# Patient Record
Sex: FEMALE | Race: WHITE | NOT HISPANIC OR LATINO | Employment: OTHER | URBAN - METROPOLITAN AREA
[De-identification: names, ages, dates, MRNs, and addresses within clinical notes are randomized per-mention and may not be internally consistent; named-entity substitution may affect disease eponyms.]

---

## 2017-06-27 RX ORDER — SIMVASTATIN 20 MG
TABLET ORAL
Qty: 90 TAB | Refills: 0 | Status: SHIPPED | OUTPATIENT
Start: 2017-06-27 | End: 2017-09-23 | Stop reason: SDUPTHER

## 2017-06-27 RX ORDER — OMEPRAZOLE 20 MG/1
CAPSULE, DELAYED RELEASE ORAL
Qty: 90 CAP | Refills: 0 | Status: SHIPPED | OUTPATIENT
Start: 2017-06-27 | End: 2017-09-23 | Stop reason: SDUPTHER

## 2017-06-27 RX ORDER — VALSARTAN AND HYDROCHLOROTHIAZIDE 80; 12.5 MG/1; MG/1
TABLET, FILM COATED ORAL
Qty: 90 TAB | Refills: 0 | Status: SHIPPED | OUTPATIENT
Start: 2017-06-27 | End: 2017-09-23 | Stop reason: SDUPTHER

## 2017-07-06 ENCOUNTER — HOSPITAL ENCOUNTER (OUTPATIENT)
Dept: RADIOLOGY | Facility: MEDICAL CENTER | Age: 77
End: 2017-07-06
Attending: FAMILY MEDICINE
Payer: MEDICARE

## 2017-07-06 DIAGNOSIS — Z12.31 VISIT FOR SCREENING MAMMOGRAM: ICD-10-CM

## 2017-07-06 PROCEDURE — 77063 BREAST TOMOSYNTHESIS BI: CPT

## 2017-08-25 ENCOUNTER — TELEPHONE (OUTPATIENT)
Dept: MEDICAL GROUP | Facility: MEDICAL CENTER | Age: 77
End: 2017-08-25

## 2017-08-25 NOTE — TELEPHONE ENCOUNTER
ANNUAL WELLNESS VISIT PRE-VISIT PLANNING     1.  Reviewed note from last office visit with PCP: YES    2.  If any orders were placed at last visit, do we have Results/Consult Notes?        •  Labs - Labs were not ordered at last office visit.       •  Imaging - Imaging was not ordered at last office visit.       •  Referrals - No referrals were ordered at last office visit.    3.  Immunizations were updated in [facility] using WebIZ?: Yes       •  WebIZ Recommendations: Zoster (Shingles)  Influenza w/preserv.  Tdap       •  Is patient due for Tdap? NO       •  Is patient due for Shingles? NO     4.  Patient is due for the following Health Maintenance Topics:   Health Maintenance Due   Topic Date Due   • Annual Wellness Visit  1940   • IMM DTaP/Tdap/Td Vaccine (1 - Tdap) 12/26/1959   • PAP SMEAR  12/26/1961   • COLONOSCOPY  12/26/1990   • IMM ZOSTER VACCINE  12/26/2000   • BONE DENSITY  12/26/2005   • IMM INFLUENZA (1) 09/01/2017       - Patient is up-to-date on all Health Maintenance topics. No records have been requested at this time.    5.  Reviewed/Updated the following with patient:       •   Preferred Pharmacy? NO       •   Preferred Lab? NO       •   Medications? NO       •   Social History? NO       •   Family History? YES. Was Abstract Encounter opened and chart updated? NO    6.  Care Team Updated:       •   DME Company (gait device, O2, CPAP, etc.): NO       •   Other Specialists (eye doctor, derm, GYN, cardiology, endo, etc): NO    7.  Patient has the following Care Path diagnoses on Problem List:  NONE    8.  Specialty Comments was updated with diagnosis information provided by [facility]: NO    9.  Patient was not advised: “This is a free wellness visit. The provider will screen for medical conditions to help you stay healthy. If you have other concerns to address you may be asked to discuss these at a separate visit or there may be an additional fee.”     6.  Patient was NOT informed to arrive 15 min prior to  their scheduled appointment and bring in their medication bottles.

## 2017-08-31 ENCOUNTER — OFFICE VISIT (OUTPATIENT)
Dept: MEDICAL GROUP | Facility: MEDICAL CENTER | Age: 77
End: 2017-08-31
Payer: MEDICARE

## 2017-08-31 VITALS
SYSTOLIC BLOOD PRESSURE: 146 MMHG | HEIGHT: 58 IN | WEIGHT: 126.2 LBS | BODY MASS INDEX: 26.49 KG/M2 | TEMPERATURE: 97.3 F | DIASTOLIC BLOOD PRESSURE: 80 MMHG | RESPIRATION RATE: 96 BRPM | HEART RATE: 74 BPM

## 2017-08-31 DIAGNOSIS — I10 ESSENTIAL HYPERTENSION: ICD-10-CM

## 2017-08-31 DIAGNOSIS — Z00.00 MEDICARE ANNUAL WELLNESS VISIT, SUBSEQUENT: ICD-10-CM

## 2017-08-31 DIAGNOSIS — M19.90 OSTEOARTHRITIS, UNSPECIFIED OSTEOARTHRITIS TYPE, UNSPECIFIED SITE: ICD-10-CM

## 2017-08-31 DIAGNOSIS — M54.32 SCIATICA OF LEFT SIDE: ICD-10-CM

## 2017-08-31 DIAGNOSIS — G14 POST-POLIO SYNDROME: ICD-10-CM

## 2017-08-31 DIAGNOSIS — Z13.0 SCREENING FOR DEFICIENCY ANEMIA: ICD-10-CM

## 2017-08-31 DIAGNOSIS — K21.9 GASTROESOPHAGEAL REFLUX DISEASE, ESOPHAGITIS PRESENCE NOT SPECIFIED: ICD-10-CM

## 2017-08-31 DIAGNOSIS — R73.9 HYPERGLYCEMIA: ICD-10-CM

## 2017-08-31 DIAGNOSIS — Z13.29 SCREENING FOR THYROID DISORDER: ICD-10-CM

## 2017-08-31 DIAGNOSIS — H16.403 CORNEAL NEOVASCULARIZATION OF BOTH EYES: ICD-10-CM

## 2017-08-31 DIAGNOSIS — E78.2 MIXED HYPERLIPIDEMIA: ICD-10-CM

## 2017-08-31 DIAGNOSIS — H81.13 BENIGN PAROXYSMAL POSITIONAL VERTIGO DUE TO BILATERAL VESTIBULAR DISORDER: ICD-10-CM

## 2017-08-31 DIAGNOSIS — H93.13 TINNITUS, BILATERAL: ICD-10-CM

## 2017-08-31 DIAGNOSIS — Z12.11 SCREENING FOR COLON CANCER: ICD-10-CM

## 2017-08-31 DIAGNOSIS — M51.36 DDD (DEGENERATIVE DISC DISEASE), LUMBAR: ICD-10-CM

## 2017-08-31 PROCEDURE — G0439 PPPS, SUBSEQ VISIT: HCPCS | Performed by: FAMILY MEDICINE

## 2017-08-31 RX ORDER — CHLORAL HYDRATE 500 MG
1000 CAPSULE ORAL
COMMUNITY

## 2017-08-31 ASSESSMENT — PATIENT HEALTH QUESTIONNAIRE - PHQ9: CLINICAL INTERPRETATION OF PHQ2 SCORE: 0

## 2017-08-31 NOTE — PROGRESS NOTES
Chief Complaint   Patient presents with   • Annual Wellness Visit         HPI:  Miriam is a 76 y.o. 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     • Omega-3 Fatty Acids (FISH OIL) 1000 MG Cap capsule Take 1,000 mg by mouth 3 times a day, with meals.     • omeprazole (PRILOSEC) 20 MG delayed-release capsule Take 1 capsule by mouth  every day 90 Cap 0   • valsartan-hydrochlorothiazide (DIOVAN-HCT) 80-12.5 MG per tablet Take 1 tablet by mouth  every day 90 Tab 0   • simvastatin (ZOCOR) 20 MG Tab Take 1 tablet by mouth  every evening 90 Tab 0   • ibuprofen (MOTRIN) 600 MG Tab Take 600 mg by mouth.     • DHA-EPA-VITAMIN E PO Take  by mouth.       No current facility-administered medications for this visit.         Patient is taking medications as noted in medication list.  Current supplements as per medication list.     Allergies: Penicillins    Current social contact/activities: Pt keeps herself busy with daily activities.      Is patient current with immunizations? No, due for PNEUMOVAX (PPSV23). Patient is interested in receiving TDAP today.    She  reports that she quit smoking about 42 years ago. Her smoking use included Cigarettes. She has a 2.50 pack-year smoking history. She has never used smokeless  tobacco. She reports that she drinks about 2.4 oz of alcohol per week . She reports that she does not use drugs.  Counseling given: Not Answered        DPA/Advanced directive: Patient has Advanced Directive on file.     ROS:    Gait: Uses no assistive device   Ostomy: no   Other tubes: no   Amputations: no   Chronic oxygen use no   Last eye exam July 13th    Wears hearing aids: no   : Denies incontinence.           Depression Screening    Little interest or pleasure in doing things?  0 - not at all  Feeling down, depressed, or hopeless? 0 - not at all  Patient Health Questionnaire Score: 0    If depressive symptoms identified deferred to follow up visit unless specifically addressed in assessment and plan.    Interpretation of PHQ-9 Total Score   Score Severity   1-4 No Depression   5-9 Mild Depression   10-14 Moderate Depression   15-19 Moderately Severe Depression   20-27 Severe Depression    Screening for Cognitive Impairment    Three Minute Recall (apple, watch, aidee)  3/3    Draw clock face with all 12 numbers set to the hand to show 10 minutes past 11 o'clock  1 5/5  If cognitive concerns identified, deferred for follow up unless specifically addressed in assessment and plan.    Fall Risk Assessment    Has the patient had two or more falls in the last year or any fall with injury in the last year?  No  If fall risk identified, deferred for follow up unless specifically addressed in assessment and plan.    Safety Assessment    Throw rugs on floor.  Yes  Handrails on all stairs.  Yes  Good lighting in all hallways.  Yes  Difficulty hearing.  No  Patient counseled about all safety risks that were identified.    Functional Assessment ADLs    Are there any barriers preventing you from cooking for yourself or meeting nutritional needs?  No.    Are there any barriers preventing you from driving safely or obtaining transportation?  No.    Are there any barriers preventing you from using a telephone or calling for  help?  No.    Are there any barriers preventing you from shopping?  No.    Are there any barriers preventing you from taking care of your own finances?  No.    Are there any barriers preventing you from managing your medications?  No.    Are you currently engaging any exercise or physical activity?  Yes.  Pt walks and plays pickle ball.     Health Maintenance Summary                Annual Wellness Visit Overdue 1940     IMM DTaP/Tdap/Td Vaccine Overdue 12/26/1959     PAP SMEAR Overdue 12/26/1961     COLONOSCOPY Overdue 12/26/1990     IMM ZOSTER VACCINE Overdue 12/26/2000     BONE DENSITY Overdue 12/26/2005     IMM INFLUENZA Next Due 9/1/2017      Done 10/10/2016 Imm Admin: Influenza Vaccine Adult HD    IMM PNEUMOCOCCAL 65+ (ADULT) LOW/MEDIUM RISK SERIES Next Due 10/10/2017      Done 10/10/2016 Imm Admin: Pneumococcal Conjugate Vaccine (Prevnar/PCV-13)    MAMMOGRAM Next Due 7/6/2018      Done 7/6/2017 MA-MAMMO SCREENING BILAT W/TOMOSYNTHESIS W/CAD     Patient has more history with this topic...          Patient Care Team:  Delfina Dietz M.D. as PCP - General  Chriss Medina M.D. as Consulting Physician (Ophthalmology)    Social History   Substance Use Topics   • Smoking status: Former Smoker     Packs/day: 0.25     Years: 10.00     Types: Cigarettes     Quit date: 8/23/1975   • Smokeless tobacco: Never Used   • Alcohol use 2.4 oz/week     4 Glasses of wine per week      Comment: weekly      Family History   Problem Relation Age of Onset   • Hypertension Mother    • Lung Disease Mother      pulmonary fibrosis   • Other Father      trauma     She  has a past medical history of Corneal neovascularization of both eyes; GERD (gastroesophageal reflux disease); Hyperlipidemia; Hypertension; and Postpolio syndrome. She also has no past medical history of Breast cancer (CMS-HCC).   Past Surgical History:   Procedure Laterality Date   • ABDOMINAL HYSTERECTOMY TOTAL     • ACHILLES TENDON REPAIR Left    • CHOLECYSTECTOMY  "    • HERNIA REPAIR     • PRIMARY C SECTION     • SHOULDER ARTHROSCOPY     • TENDON RELEASE FOOT      ×3. Secondary to polio.   • TONSILLECTOMY AND ADENOIDECTOMY             Exam:     Blood pressure 146/80, pulse 74, temperature 36.3 °C (97.3 °F), resp. rate (!) 96, height 1.467 m (4' 9.75\"), weight 57.2 kg (126 lb 3.2 oz). Body mass index is 26.6 kg/m².    Hearing good.    Dentition good  Alert, oriented in no acute distress.  Eye contact is good, speech goal directed, affect calm  Neck: Supple, no adenopathy  Lungs: Clear to auscultation bilaterally without wheezes or rhonchi  CV: Regular rate and rhythm without murmur    Assessment and Plan. The following treatment and monitoring plan is recommended:    1. Medicare annual wellness visit, subsequent  Routine anticipatory guidance. No special services needed at this time    2. DDD (degenerative disc disease), lumbar  Stable. Continue stretching exercises.    3. Sciatica of left side  Stable. Continue current therapy    4. Post-polio syndrome  Stable. Continue current therapy    5. Osteoarthritis, unspecified osteoarthritis type, unspecified site  Stable. Continue current therapy    6. Tinnitus, bilateral  Refer to audiology  - REFERRAL TO AUDIOLOGY    7. Screening for colon cancer    - OCCULT BLOOD FECES IMMUNOASSAY (FIT); Future    8. Mixed hyperlipidemia  Check lipid panel and adjust dose as needed  - LIPID PROFILE; Future    9. Gastroesophageal reflux disease, esophagitis presence not specified  Stable. Continue current medications  - CBC WITH DIFFERENTIAL; Future    10. Essential hypertension  Stable. Continue current medications  - COMP METABOLIC PANEL; Future    11. Benign paroxysmal positional vertigo due to bilateral vestibular disorder  Refer to audiology    12. Corneal neovascularization of both eyes  Continue ophthalmology follow-up    13. Hyperglycemia  Stable. Continue diet and exercise. Recheck labs  - COMP METABOLIC PANEL; Future  - HEMOGLOBIN A1C; " Future    14. Screening for deficiency anemia  Screening labs ordered.  Await results for counseling.      15. Screening for thyroid disorder  Screening labs ordered.  Await results for counseling.    - TSH; Future      Services suggested: No services needed at this time  Health Care Screening recommendations as per orders if indicated.  Referrals offered: PT/OT/Nutrition counseling/Behavioral Health/Smoking cessation as per orders if indicated.    Discussion today about general wellness and lifestyle habits:    · Prevent falls and reduce trip hazards; Cautioned about securing or removing rugs.  · Have a working fire alarm and carbon monoxide detector;   · Engage in regular physical activity and social activities       Follow-up: Return in about 1 year (around 8/31/2018).

## 2017-09-07 ENCOUNTER — HOSPITAL ENCOUNTER (OUTPATIENT)
Facility: MEDICAL CENTER | Age: 77
End: 2017-09-07
Attending: FAMILY MEDICINE
Payer: MEDICARE

## 2017-09-07 PROCEDURE — 82274 ASSAY TEST FOR BLOOD FECAL: CPT

## 2017-09-12 ENCOUNTER — TELEPHONE (OUTPATIENT)
Dept: MEDICAL GROUP | Facility: MEDICAL CENTER | Age: 77
End: 2017-09-12

## 2017-09-12 DIAGNOSIS — Z12.11 SCREENING FOR COLON CANCER: ICD-10-CM

## 2017-09-12 NOTE — TELEPHONE ENCOUNTER
1. Caller Name: Pt                      Call Back Number: [phone]    2. Message: Pt called to get her lab results she had done on 9/8/17 at [facility]. Results are in media.     3. Patient approves office to leave a detailed voicemail/MyChart message: yes

## 2017-09-13 LAB — HEMOCCULT STL QL IA: NEGATIVE

## 2017-09-14 NOTE — TELEPHONE ENCOUNTER
Please inform pt:  Labs reviewed. Kidney function, liver function, cholesterol look good. Glucose slightly elevated at 104. Watch carbohydrates and sugars in diet. Continue current medications

## 2017-09-26 RX ORDER — VALSARTAN AND HYDROCHLOROTHIAZIDE 80; 12.5 MG/1; MG/1
TABLET, FILM COATED ORAL
Qty: 90 TAB | Refills: 3 | Status: SHIPPED | OUTPATIENT
Start: 2017-09-26 | End: 2018-08-31 | Stop reason: SDUPTHER

## 2017-09-26 RX ORDER — SIMVASTATIN 20 MG
20 TABLET ORAL
Qty: 90 TAB | Refills: 0 | OUTPATIENT
Start: 2017-09-26

## 2017-09-26 RX ORDER — VALSARTAN AND HYDROCHLOROTHIAZIDE 80; 12.5 MG/1; MG/1
1 TABLET, FILM COATED ORAL
Qty: 90 TAB | Refills: 0 | OUTPATIENT
Start: 2017-09-26

## 2017-09-26 RX ORDER — OMEPRAZOLE 20 MG/1
20 CAPSULE, DELAYED RELEASE ORAL
Qty: 90 CAP | Refills: 0 | OUTPATIENT
Start: 2017-09-26

## 2017-09-26 RX ORDER — OMEPRAZOLE 20 MG/1
CAPSULE, DELAYED RELEASE ORAL
Qty: 90 CAP | Refills: 3 | Status: SHIPPED | OUTPATIENT
Start: 2017-09-26 | End: 2018-08-31 | Stop reason: SDUPTHER

## 2017-09-26 RX ORDER — SIMVASTATIN 20 MG
TABLET ORAL
Qty: 90 TAB | Refills: 3 | Status: SHIPPED | OUTPATIENT
Start: 2017-09-26 | End: 2018-08-31 | Stop reason: SDUPTHER

## 2017-09-26 NOTE — TELEPHONE ENCOUNTER
Was the patient seen in the last year in this department? Yes     Does patient have an active prescription for medications requested? No     Received Request Via: Patient     Last seen: 08/31/2017 Dietz

## 2017-09-26 NOTE — TELEPHONE ENCOUNTER
From: Miriam Noonan  Sent: 9/26/2017 9:11 AM PDT  Subject: Medication Renewal Request    Miriam Noonan would like a refill of the following medications:  omeprazole (PRILOSEC) 20 MG delayed-release capsule [Delfina Dietz M.D.]  valsartan-hydrochlorothiazide (DIOVAN-HCT) 80-12.5 MG per tablet [Delfina Dietz M.D.]  simvastatin (ZOCOR) 20 MG Tab [Delfina Dietz M.D.]    Preferred pharmacy: [facility] - [address]    Comment:

## 2018-07-10 ENCOUNTER — HOSPITAL ENCOUNTER (OUTPATIENT)
Dept: RADIOLOGY | Facility: MEDICAL CENTER | Age: 78
End: 2018-07-10
Attending: FAMILY MEDICINE
Payer: MEDICARE

## 2018-07-10 DIAGNOSIS — Z12.31 VISIT FOR SCREENING MAMMOGRAM: ICD-10-CM

## 2018-07-10 PROCEDURE — 77067 SCR MAMMO BI INCL CAD: CPT

## 2018-08-31 RX ORDER — OMEPRAZOLE 20 MG/1
CAPSULE, DELAYED RELEASE ORAL
Qty: 90 CAP | Refills: 0 | Status: SHIPPED | OUTPATIENT
Start: 2018-08-31 | End: 2018-09-04 | Stop reason: SDUPTHER

## 2018-08-31 RX ORDER — SIMVASTATIN 20 MG
TABLET ORAL
Qty: 90 TAB | Refills: 0 | Status: SHIPPED | OUTPATIENT
Start: 2018-08-31 | End: 2018-09-04 | Stop reason: SDUPTHER

## 2018-08-31 RX ORDER — VALSARTAN AND HYDROCHLOROTHIAZIDE 80; 12.5 MG/1; MG/1
TABLET, FILM COATED ORAL
Qty: 90 TAB | Refills: 0 | Status: SHIPPED | OUTPATIENT
Start: 2018-08-31 | End: 2018-09-04 | Stop reason: SDUPTHER

## 2018-09-04 ENCOUNTER — OFFICE VISIT (OUTPATIENT)
Dept: MEDICAL GROUP | Facility: MEDICAL CENTER | Age: 78
End: 2018-09-04
Payer: MEDICARE

## 2018-09-04 VITALS
TEMPERATURE: 97.9 F | OXYGEN SATURATION: 95 % | DIASTOLIC BLOOD PRESSURE: 70 MMHG | SYSTOLIC BLOOD PRESSURE: 142 MMHG | BODY MASS INDEX: 26.87 KG/M2 | HEIGHT: 58 IN | HEART RATE: 76 BPM | WEIGHT: 128 LBS | RESPIRATION RATE: 20 BRPM

## 2018-09-04 DIAGNOSIS — Z12.11 SCREENING FOR COLON CANCER: ICD-10-CM

## 2018-09-04 DIAGNOSIS — M19.90 OSTEOARTHRITIS, UNSPECIFIED OSTEOARTHRITIS TYPE, UNSPECIFIED SITE: ICD-10-CM

## 2018-09-04 DIAGNOSIS — Z13.0 SCREENING FOR DEFICIENCY ANEMIA: ICD-10-CM

## 2018-09-04 DIAGNOSIS — G14 POST-POLIO SYNDROME: ICD-10-CM

## 2018-09-04 DIAGNOSIS — I10 ESSENTIAL HYPERTENSION: ICD-10-CM

## 2018-09-04 DIAGNOSIS — H81.13 BENIGN PAROXYSMAL POSITIONAL VERTIGO DUE TO BILATERAL VESTIBULAR DISORDER: ICD-10-CM

## 2018-09-04 DIAGNOSIS — M54.32 SCIATICA OF LEFT SIDE: ICD-10-CM

## 2018-09-04 DIAGNOSIS — H16.403 CORNEAL NEOVASCULARIZATION OF BOTH EYES: ICD-10-CM

## 2018-09-04 DIAGNOSIS — M79.641 RIGHT HAND PAIN: ICD-10-CM

## 2018-09-04 DIAGNOSIS — M51.36 DDD (DEGENERATIVE DISC DISEASE), LUMBAR: ICD-10-CM

## 2018-09-04 DIAGNOSIS — E78.2 MIXED HYPERLIPIDEMIA: ICD-10-CM

## 2018-09-04 DIAGNOSIS — H93.13 TINNITUS OF BOTH EARS: ICD-10-CM

## 2018-09-04 DIAGNOSIS — Z00.00 MEDICARE ANNUAL WELLNESS VISIT, SUBSEQUENT: ICD-10-CM

## 2018-09-04 DIAGNOSIS — R73.9 HYPERGLYCEMIA: ICD-10-CM

## 2018-09-04 DIAGNOSIS — K21.9 GASTROESOPHAGEAL REFLUX DISEASE, ESOPHAGITIS PRESENCE NOT SPECIFIED: ICD-10-CM

## 2018-09-04 DIAGNOSIS — Z13.29 SCREENING FOR THYROID DISORDER: ICD-10-CM

## 2018-09-04 PROCEDURE — G0439 PPPS, SUBSEQ VISIT: HCPCS | Performed by: FAMILY MEDICINE

## 2018-09-04 RX ORDER — SIMVASTATIN 20 MG
TABLET ORAL
Qty: 90 TAB | Refills: 3 | Status: SHIPPED | OUTPATIENT
Start: 2018-09-04

## 2018-09-04 RX ORDER — VALSARTAN AND HYDROCHLOROTHIAZIDE 80; 12.5 MG/1; MG/1
1 TABLET, FILM COATED ORAL
Qty: 90 TAB | Refills: 3 | Status: SHIPPED | OUTPATIENT
Start: 2018-09-04

## 2018-09-04 RX ORDER — OMEPRAZOLE 20 MG/1
20 CAPSULE, DELAYED RELEASE ORAL
Qty: 90 CAP | Refills: 3 | Status: SHIPPED | OUTPATIENT
Start: 2018-09-04

## 2018-09-04 ASSESSMENT — ACTIVITIES OF DAILY LIVING (ADL): BATHING_REQUIRES_ASSISTANCE: 0

## 2018-09-04 ASSESSMENT — PATIENT HEALTH QUESTIONNAIRE - PHQ9: CLINICAL INTERPRETATION OF PHQ2 SCORE: 0

## 2018-09-04 ASSESSMENT — ENCOUNTER SYMPTOMS: GENERAL WELL-BEING: GOOD

## 2018-09-04 NOTE — PROGRESS NOTES
Chief Complaint   Patient presents with   • Annual Exam         HPI:  Miriam Noonan is a 77 y.o. here for Medicare Annual Wellness Visit     Patient Active Problem List    Diagnosis Date Noted   • DDD (degenerative disc disease), lumbar 08/31/2017   • Hyperglycemia 08/31/2017   • Corneal neovascularization of both eyes    • Gastroesophageal reflux disease 08/23/2016   • Essential hypertension 08/23/2016   • Osteoarthritis 08/23/2016   • Mixed hyperlipidemia 08/23/2016   • Sciatica of left side 08/23/2016   • Tinnitus 08/23/2016   • Benign paroxysmal positional vertigo due to bilateral vestibular disorder 08/23/2016   • Post-polio syndrome 08/23/2016       Current Outpatient Prescriptions   Medication Sig Dispense Refill   • valsartan-hydrochlorothiazide (DIOVAN-HCT) 80-12.5 MG per tablet Take 1 Tab by mouth every day. 90 Tab 3   • simvastatin (ZOCOR) 20 MG Tab TAKE 1 TABLET BY MOUTH  EVERY EVENING 90 Tab 3   • omeprazole (PRILOSEC) 20 MG delayed-release capsule Take 1 Cap by mouth every day. 90 Cap 3   • Multiple Vitamins-Minerals (PRESERVISION AREDS 2 PO) Take  by mouth.     • Omega-3 Fatty Acids (FISH OIL) 1000 MG Cap capsule Take 1,000 mg by mouth 3 times a day, with meals.     • ibuprofen (MOTRIN) 600 MG Tab Take 600 mg by mouth.     • DHA-EPA-VITAMIN E PO Take  by mouth.       No current facility-administered medications for this visit.             Current supplements as per medication list.       Allergies: Penicillins    Current social contact/activities: Golfs and has an active social life    She  reports that she quit smoking about 43 years ago. Her smoking use included Cigarettes. She has a 2.50 pack-year smoking history. She has never used smokeless tobacco. She reports that she drinks about 2.4 oz of alcohol per week . She reports that she does not use drugs.  Counseling given: Not Answered      DPA/Advanced Directive:  Patient has Advanced Directive on file.     ROS:    Gait: Uses no assistive  device  Ostomy: No  Other tubes: No  Amputations: No  Chronic oxygen use: No  Last eye exam: in last year  Wears hearing aids: No   : Denies any urinary leakage during the last 6 months    Screening:    Depression Screening    Little interest or pleasure in doing things?  0 - not at all  Feeling down, depressed , or hopeless? 0 - not at all  Patient Health Questionnaire Score: 0     If depressive symptoms identified deferred to follow up visit unless specifically addressed in assessment and plan.    Interpretation of PHQ-9 Total Score   Score Severity   1-4 No Depression   5-9 Mild Depression   10-14 Moderate Depression   15-19 Moderately Severe Depression   20-27 Severe Depression    Screening for Cognitive Impairment    Three Minute Recall (leader, season, table) 3/3    Anmol clock face with all 12 numbers and set the hands to show 10 past 11.  Yes    Cognitive concerns identified deferred for follow up unless specifically addressed in assessment and plan.    Fall Risk Assessment    Has the patient had two or more falls in the last year or any fall with injury in the last year?  Yes    Safety Assessment    Throw rugs on floor.  Yes  Handrails on all stairs.  Yes  Good lighting in all hallways.  Yes  Difficulty hearing.  No  Patient counseled about all safety risks that were identified.    Functional Assessment ADLs    Are there any barriers preventing you from cooking for yourself or meeting nutritional needs?  No.    Are there any barriers preventing you from driving safely or obtaining transportation?  No.    Are there any barriers preventing you from using a telephone or calling for help?  No.    Are there any barriers preventing you from shopping?  No.    Are there any barriers preventing you from taking care of your own finances?  No.    Are there any barriers preventing you from managing your medications?  No.    Are there any barriers preventing you from showering, bathing or dressing yourself?  No.    Are  you currently engaging in any exercise or physical activity?  Yes.     What is your perception of your health?  Good.      Health Maintenance Summary                IMM DTaP/Tdap/Td Vaccine Overdue 12/26/1959     BONE DENSITY Overdue 12/26/2005     IMM ZOSTER VACCINES Overdue 11/3/2014      Done 9/8/2014 Ext Imm: Zoster (Shingles) Vaccine    IMM PNEUMOCOCCAL 65+ (ADULT) LOW/MEDIUM RISK SERIES Overdue 10/10/2017      Done 10/10/2016 Imm Admin: Pneumococcal Conjugate Vaccine (Prevnar/PCV-13)    Annual Wellness Visit Overdue 9/1/2018      Done 8/31/2017 Visit Dx: Medicare annual wellness visit, subsequent    IMM INFLUENZA Overdue 9/1/2018      Done 10/10/2016 Imm Admin: Influenza Vaccine Adult HD     Patient has more history with this topic...    COLON CANCER SCREENING ANNUAL FIT Next Due 9/7/2018      Done 9/7/2017 OCCULT BLOOD FECES IMMUNOASSAY     Patient has more history with this topic...    MAMMOGRAM Next Due 7/10/2019      Done 7/10/2018 MA-MAMMO SCREENING BILAT W/TOMOSYNTHESIS W/CAD     Patient has more history with this topic...          Patient Care Team:  Delfina Dietz M.D. as PCP - General  Chriss Median M.D. as Consulting Physician (Ophthalmology)        Social History   Substance Use Topics   • Smoking status: Former Smoker     Packs/day: 0.25     Years: 10.00     Types: Cigarettes     Quit date: 8/23/1975   • Smokeless tobacco: Never Used   • Alcohol use 2.4 oz/week     4 Glasses of wine per week      Comment: weekly      Family History   Problem Relation Age of Onset   • Hypertension Mother    • Lung Disease Mother         pulmonary fibrosis   • Other Father         trauma     She  has a past medical history of Corneal neovascularization of both eyes; GERD (gastroesophageal reflux disease); Hyperlipidemia; Hypertension; and Postpolio syndrome. She also has no past medical history of Breast cancer (HCC).   Past Surgical History:   Procedure Laterality Date   • ABDOMINAL HYSTERECTOMY TOTAL     •  "ACHILLES TENDON REPAIR Left    • CHOLECYSTECTOMY     • HERNIA REPAIR     • PRIMARY C SECTION     • SHOULDER ARTHROSCOPY     • TENDON RELEASE FOOT      ×3. Secondary to polio.   • TONSILLECTOMY AND ADENOIDECTOMY         Exam:   Blood pressure 142/70, pulse 76, temperature 36.6 °C (97.9 °F), resp. rate 20, height 1.467 m (4' 9.75\"), weight 58.1 kg (128 lb), SpO2 95 %. Body mass index is 26.98 kg/m².    Hearing good.    Dentition good  Alert, oriented in no acute distress.  Eye contact is good, speech goal directed, affect calm  Constitutional: Alert, no distress.  Skin: Warm, dry, good turgor, no rashes in visible areas.  Eye: Equal, round and reactive, conjunctiva clear, lids normal.  ENMT: Lips without lesions, good dentition, oropharynx clear.  Neck: Trachea midline, no masses, no thyromegaly. No cervical or supraclavicular lymphadenopathy  Respiratory: Unlabored respiratory effort, lungs clear to auscultation, no wheezes, no ronchi.  Cardiovascular: Normal S1, S2, RRR, no murmur, no edema.  Abdomen: Soft, non-tender, no masses, no hepatosplenomegaly.  Psych: Alert and oriented x3, normal affect and mood.  BREAST EXAM: No skin changes, peau d'orange or nipple retraction. No discharge. No axillary or supraclavicular adenopathy. No masses or nodularity palpable.   Hand: Left hand with exquisite tenderness to palpation of the fifth metacarpal.  No deformity.  Good  pulses and strength.  Full range of motion      Assessment and Plan. The following treatment and monitoring plan is recommended:    1. Medicare annual wellness visit, subsequent  Routine anticipatory guidance.  No special services needed at this time  - Annual Wellness Visit - Includes PPPS Subsequent ()    2. Corneal neovascularization of both eyes  Continue follow-up with ophthalmology  - Annual Wellness Visit - Includes PPPS Subsequent ()    3. DDD (degenerative disc disease), lumbar  Stable.  Patient is keeping active and stretching " regularly  - Annual Wellness Visit - Includes PPPS Subsequent ()    4. Essential hypertension  This is a chronic medical condition that is currently stable  Continue Diovan HCT  - Annual Wellness Visit - Includes PPPS Subsequent ()  - valsartan-hydrochlorothiazide (DIOVAN-HCT) 80-12.5 MG per tablet; Take 1 Tab by mouth every day.  Dispense: 90 Tab; Refill: 3  - COMP METABOLIC PANEL; Future    5. Gastroesophageal reflux disease, esophagitis presence not specified  This is a chronic medical condition that is currently stable  Continue omeprazole  - Annual Wellness Visit - Includes PPPS Subsequent ()  - omeprazole (PRILOSEC) 20 MG delayed-release capsule; Take 1 Cap by mouth every day.  Dispense: 90 Cap; Refill: 3    6. Hyperglycemia  Dietary counseling done.  Check labs  - Annual Wellness Visit - Includes PPPS Subsequent ()  - COMP METABOLIC PANEL; Future  - HEMOGLOBIN A1C; Future    7. Mixed hyperlipidemia  This is a chronic medical condition that is currently stable  Continue simvastatin  - Annual Wellness Visit - Includes PPPS Subsequent ()  - simvastatin (ZOCOR) 20 MG Tab; TAKE 1 TABLET BY MOUTH  EVERY EVENING  Dispense: 90 Tab; Refill: 3  - LIPID PROFILE; Future    8. Osteoarthritis, unspecified osteoarthritis type, unspecified site  This is a chronic medical condition that is currently stable  Patient keeps active  - Annual Wellness Visit - Includes PPPS Subsequent ()    9. Post-polio syndrome  Patient has had a couple of falls where she thinks she tripped on her left foot which is the one that is affected by the polio.  We discussed fall prevention  - Annual Wellness Visit - Includes PPPS Subsequent ()    10. Sciatica of left side  This is a chronic medical condition that is currently stable  Continue exercises  - Annual Wellness Visit - Includes PPPS Subsequent ()    11. Benign paroxysmal positional vertigo due to bilateral vestibular disorder  This is a chronic medical  condition that is currently stable  - Annual Wellness Visit - Includes PPPS Subsequent ()    12. Tinnitus of both ears  This is a chronic medical condition that is currently stable    - Annual Wellness Visit - Includes PPPS Subsequent ()    13. Screening for colon cancer    - Annual Wellness Visit - Includes PPPS Subsequent ()  - OCCULT BLOOD FECES IMMUNOASSAY (FIT); Future    14. Right hand pain  Patient was golfing and a branch of the [employer] cracked and fell and hit her hand.  She still has quite a bit of tenderness will get an x-ray on this  - DX-HAND 3+ LEFT; Future    15. Screening for deficiency anemia  Screening labs ordered.  Await results for counseling.    - CBC WITH DIFFERENTIAL; Future    16. Screening for thyroid disorder  Screening labs ordered.  Await results for counseling.    - TSH; Future        Services suggested: No services needed at this time  Health Care Screening: Age-appropriate preventive services recommended by USPTF and ACIP covered by Medicare were discussed today. Services ordered if indicated and agreed upon by the patient.  Referrals offered: Community-based lifestyle interventions to reduce health risks and promote self-management and wellness, fall prevention, nutrition, physical activity, tobacco-use cessation, weight loss, and mental health services as per orders if indicated.    Discussion today about general wellness and lifestyle habits:    · Prevent falls and reduce trip hazards; Cautioned about securing or removing rugs.  · Have a working fire alarm and carbon monoxide detector;   · Engage in regular physical activity and social activities     Follow-up: Return in about 1 year (around 9/4/2019).

## 2018-09-07 ENCOUNTER — HOSPITAL ENCOUNTER (OUTPATIENT)
Facility: MEDICAL CENTER | Age: 78
End: 2018-09-07
Attending: FAMILY MEDICINE
Payer: MEDICARE

## 2018-09-07 PROCEDURE — 82274 ASSAY TEST FOR BLOOD FECAL: CPT

## 2018-09-14 ENCOUNTER — PATIENT MESSAGE (OUTPATIENT)
Dept: MEDICAL GROUP | Facility: MEDICAL CENTER | Age: 78
End: 2018-09-14

## 2018-09-16 DIAGNOSIS — Z12.11 SCREENING FOR COLON CANCER: ICD-10-CM

## 2018-09-18 LAB — HEMOCCULT STL QL IA: NEGATIVE

## 2019-07-11 ENCOUNTER — HOSPITAL ENCOUNTER (OUTPATIENT)
Dept: RADIOLOGY | Facility: MEDICAL CENTER | Age: 79
End: 2019-07-11
Attending: FAMILY MEDICINE
Payer: MEDICARE

## 2019-07-11 DIAGNOSIS — Z12.31 VISIT FOR SCREENING MAMMOGRAM: ICD-10-CM

## 2019-07-11 PROCEDURE — 77063 BREAST TOMOSYNTHESIS BI: CPT

## 2020-07-16 ENCOUNTER — HOSPITAL ENCOUNTER (OUTPATIENT)
Dept: RADIOLOGY | Facility: MEDICAL CENTER | Age: 80
End: 2020-07-16
Attending: FAMILY MEDICINE
Payer: MEDICARE

## 2020-07-16 DIAGNOSIS — Z12.31 VISIT FOR SCREENING MAMMOGRAM: ICD-10-CM

## 2020-07-16 PROCEDURE — 77067 SCR MAMMO BI INCL CAD: CPT

## 2021-01-11 DIAGNOSIS — Z23 NEED FOR VACCINATION: ICD-10-CM

## 2021-07-22 ENCOUNTER — HOSPITAL ENCOUNTER (OUTPATIENT)
Dept: RADIOLOGY | Facility: MEDICAL CENTER | Age: 81
End: 2021-07-22
Attending: FAMILY MEDICINE
Payer: MEDICARE

## 2021-07-22 DIAGNOSIS — Z12.31 ENCOUNTER FOR SCREENING MAMMOGRAM FOR BREAST CANCER: ICD-10-CM

## 2021-07-22 PROCEDURE — 77063 BREAST TOMOSYNTHESIS BI: CPT
